# Patient Record
Sex: MALE | Race: OTHER | Employment: OTHER
[De-identification: names, ages, dates, MRNs, and addresses within clinical notes are randomized per-mention and may not be internally consistent; named-entity substitution may affect disease eponyms.]

---

## 2018-03-15 NOTE — PATIENT DISCUSSION
MYOPIA / HYPEROPIA, OU- DISC OPT OF REFRACTIVE SX-VS-GLS/GYOW-DH-CMTSUC. PT UNDERSTANDS OPTIONS AND DESIRES TO LASIK OU TO IMPROVE VA AND REDUCE DEPENDENCY ON GLS/CTLS.

## 2021-06-10 ENCOUNTER — PREPPED CHART (OUTPATIENT)
Age: 86
End: 2021-06-10

## 2021-06-10 ENCOUNTER — ESTABLISHED COMPREHENSIVE EXAM (OUTPATIENT)
Age: 86
End: 2021-06-10

## 2021-06-10 VITALS — HEIGHT: 60 IN

## 2021-06-10 DIAGNOSIS — H35.3132: ICD-10-CM

## 2021-06-10 DIAGNOSIS — H25.813: ICD-10-CM

## 2021-06-10 DIAGNOSIS — D31.32: ICD-10-CM

## 2021-06-10 PROCEDURE — 92015VEC REFRACTION-VEC

## 2021-06-10 PROCEDURE — 92250 FUNDUS PHOTOGRAPHY W/I&R: CPT

## 2021-06-10 PROCEDURE — 92014 COMPRE OPH EXAM EST PT 1/>: CPT

## 2021-06-10 ASSESSMENT — TONOMETRY
OD_IOP_MMHG: 17
OS_IOP_MMHG: 17

## 2021-06-10 ASSESSMENT — VISUAL ACUITY
OS_CC: 20/40
OD_CC: 20/40-1

## 2021-12-06 ENCOUNTER — ESTABLISHED PATIENT (OUTPATIENT)
Age: 86
End: 2021-12-06

## 2021-12-06 DIAGNOSIS — H35.3132: ICD-10-CM

## 2021-12-06 DIAGNOSIS — D31.32: ICD-10-CM

## 2021-12-06 DIAGNOSIS — H25.813: ICD-10-CM

## 2021-12-06 DIAGNOSIS — D31.31: ICD-10-CM

## 2021-12-06 PROCEDURE — 92015 DETERMINE REFRACTIVE STATE: CPT

## 2021-12-06 PROCEDURE — 92201 OPSCPY EXTND RTA DRAW UNI/BI: CPT

## 2021-12-06 PROCEDURE — 99214 OFFICE O/P EST MOD 30 MIN: CPT

## 2021-12-06 ASSESSMENT — VISUAL ACUITY
OD_CC: 20/40-2
OS_SC: 20/100
OD_SC: 20/100
OS_CC: 20/40-2

## 2021-12-06 ASSESSMENT — TONOMETRY
OS_IOP_MMHG: 12
OD_IOP_MMHG: 12

## 2021-12-29 ENCOUNTER — IMPORTED ENCOUNTER (OUTPATIENT)
Dept: URBAN - METROPOLITAN AREA CLINIC 31 | Facility: CLINIC | Age: 86
End: 2021-12-29

## 2021-12-29 PROBLEM — H25.813: Noted: 2021-12-29

## 2021-12-29 PROCEDURE — 99204 OFFICE O/P NEW MOD 45 MIN: CPT

## 2021-12-29 PROCEDURE — 92025 CPTRIZED CORNEAL TOPOGRAPHY: CPT

## 2021-12-29 PROCEDURE — 92136 OPHTHALMIC BIOMETRY: CPT

## 2022-01-24 ENCOUNTER — IMPORTED ENCOUNTER (OUTPATIENT)
Dept: URBAN - METROPOLITAN AREA CLINIC 31 | Facility: CLINIC | Age: 87
End: 2022-01-24

## 2022-01-24 PROBLEM — H25.813: Noted: 2022-01-24

## 2022-02-08 ENCOUNTER — POST-OP (OUTPATIENT)
Dept: URBAN - METROPOLITAN AREA CLINIC 27 | Facility: CLINIC | Age: 87
End: 2022-02-08

## 2022-02-08 DIAGNOSIS — Z96.1: ICD-10-CM

## 2022-02-08 DIAGNOSIS — H25.811: ICD-10-CM

## 2022-02-08 PROCEDURE — 99024 POSTOP FOLLOW-UP VISIT: CPT

## 2022-02-08 ASSESSMENT — VISUAL ACUITY
OD_PH: 20/30
OD_SC: 20/60
OS_SC: 20/50-2

## 2022-02-08 ASSESSMENT — TONOMETRY
OD_IOP_MMHG: 16
OS_IOP_MMHG: 16

## 2022-02-15 ENCOUNTER — POST-OP (OUTPATIENT)
Dept: URBAN - METROPOLITAN AREA CLINIC 27 | Facility: CLINIC | Age: 87
End: 2022-02-15

## 2022-02-15 DIAGNOSIS — Z96.1: ICD-10-CM

## 2022-02-15 PROCEDURE — 99024 POSTOP FOLLOW-UP VISIT: CPT

## 2022-02-15 ASSESSMENT — TONOMETRY
OD_IOP_MMHG: 14
OD_IOP_MMHG: 10
OS_IOP_MMHG: 16
OS_IOP_MMHG: 12

## 2022-02-15 ASSESSMENT — VISUAL ACUITY
OS_SC: 20/80+2
OD_SC: 20/30+2

## 2022-02-22 ENCOUNTER — POST-OP (OUTPATIENT)
Dept: URBAN - METROPOLITAN AREA CLINIC 27 | Facility: CLINIC | Age: 87
End: 2022-02-22

## 2022-02-22 DIAGNOSIS — Z96.1: ICD-10-CM

## 2022-02-22 PROCEDURE — 99024 POSTOP FOLLOW-UP VISIT: CPT

## 2022-02-22 ASSESSMENT — VISUAL ACUITY
OD_SC: 20/40-2
OS_PH: 20/30+2
OD_PH: 20/30-1
OS_SC: 20/60-1

## 2022-02-22 ASSESSMENT — TONOMETRY
OS_IOP_MMHG: 13
OD_IOP_MMHG: 12

## 2022-03-16 ENCOUNTER — POST-OP (OUTPATIENT)
Dept: URBAN - METROPOLITAN AREA CLINIC 27 | Facility: CLINIC | Age: 87
End: 2022-03-16

## 2022-03-16 DIAGNOSIS — Z96.1: ICD-10-CM

## 2022-03-16 DIAGNOSIS — H35.352: ICD-10-CM

## 2022-03-16 PROCEDURE — 92134 CPTRZ OPH DX IMG PST SGM RTA: CPT

## 2022-03-16 PROCEDURE — 99024 POSTOP FOLLOW-UP VISIT: CPT

## 2022-03-16 PROCEDURE — 92015 DETERMINE REFRACTIVE STATE: CPT

## 2022-03-16 RX ORDER — DICLOFENAC SODIUM 1 MG/ML: 1 SOLUTION OPHTHALMIC

## 2022-03-16 RX ORDER — PREDNISOLONE ACETATE 10 MG/ML: 1 SUSPENSION/ DROPS OPHTHALMIC

## 2022-03-16 ASSESSMENT — TONOMETRY
OD_IOP_MMHG: 16
OS_IOP_MMHG: 16

## 2022-03-16 ASSESSMENT — VISUAL ACUITY
OD_SC: 20/25-1
OS_SC: 20/30

## 2022-04-02 ASSESSMENT — TONOMETRY
OS_IOP_MMHG: 11
OD_IOP_MMHG: 10

## 2022-04-02 ASSESSMENT — VISUAL ACUITY
OS_GLARE: 20/100
OS_CC: 20/70-2
OD_GLARE: 20/100
OD_CC: J5
OS_SC: 20/50
OD_SC: 20/40
OS_CC: J5
OD_CC: 20/70-2

## 2022-04-13 ENCOUNTER — POST-OP (OUTPATIENT)
Dept: URBAN - METROPOLITAN AREA CLINIC 27 | Facility: CLINIC | Age: 87
End: 2022-04-13

## 2022-04-13 DIAGNOSIS — H35.352: ICD-10-CM

## 2022-04-13 DIAGNOSIS — Z96.1: ICD-10-CM

## 2022-04-13 DIAGNOSIS — D31.32: ICD-10-CM

## 2022-04-13 PROCEDURE — 92134 CPTRZ OPH DX IMG PST SGM RTA: CPT

## 2022-04-13 PROCEDURE — 92015 DETERMINE REFRACTIVE STATE: CPT

## 2022-04-13 PROCEDURE — 99024 POSTOP FOLLOW-UP VISIT: CPT

## 2022-04-13 ASSESSMENT — VISUAL ACUITY
OD_SC: 20/25-2
OS_SC: 20/25-1

## 2022-04-13 ASSESSMENT — TONOMETRY
OS_IOP_MMHG: 16
OD_IOP_MMHG: 16

## 2022-12-13 ENCOUNTER — ESTABLISHED PATIENT (OUTPATIENT)
Dept: URBAN - METROPOLITAN AREA CLINIC 27 | Facility: CLINIC | Age: 87
End: 2022-12-13

## 2022-12-13 PROCEDURE — 92014 COMPRE OPH EXAM EST PT 1/>: CPT

## 2022-12-13 ASSESSMENT — VISUAL ACUITY
OS_CC: 20/20
OD_CC: 20/20-1

## 2022-12-13 ASSESSMENT — TONOMETRY
OD_IOP_MMHG: 10
OS_IOP_MMHG: 12